# Patient Record
Sex: FEMALE | Race: WHITE | NOT HISPANIC OR LATINO | URBAN - METROPOLITAN AREA
[De-identification: names, ages, dates, MRNs, and addresses within clinical notes are randomized per-mention and may not be internally consistent; named-entity substitution may affect disease eponyms.]

---

## 2021-11-19 ENCOUNTER — LAB OUTSIDE AN ENCOUNTER (OUTPATIENT)
Dept: URBAN - METROPOLITAN AREA CLINIC 40 | Facility: CLINIC | Age: 26
End: 2021-11-19

## 2021-11-19 ENCOUNTER — OFFICE VISIT (OUTPATIENT)
Dept: URBAN - METROPOLITAN AREA CLINIC 40 | Facility: CLINIC | Age: 26
End: 2021-11-19
Payer: COMMERCIAL

## 2021-11-19 DIAGNOSIS — K20.90 ESOPHAGITIS DETERMINED BY ENDOSCOPY: ICD-10-CM

## 2021-11-19 DIAGNOSIS — R10.13 DYSPEPSIA: ICD-10-CM

## 2021-11-19 DIAGNOSIS — R19.7 DIARRHEA: ICD-10-CM

## 2021-11-19 DIAGNOSIS — K44.9 HIATAL HERNIA: ICD-10-CM

## 2021-11-19 PROCEDURE — 99204 OFFICE O/P NEW MOD 45 MIN: CPT | Performed by: INTERNAL MEDICINE

## 2021-11-19 RX ORDER — METOPROLOL TARTRATE 50 MG/1
AS DIRECTED TABLET, FILM COATED ORAL
Status: ACTIVE | COMMUNITY

## 2021-11-19 RX ORDER — VITAMIN E (DL,TOCOPHERYL ACET) 180 MG
AS DIRECTED CAPSULE ORAL
Status: ACTIVE | COMMUNITY

## 2021-11-19 RX ORDER — LEVOTHYROXINE SODIUM 0.05 MG/1
AS DIRECTED TABLET ORAL
Status: ACTIVE | COMMUNITY

## 2021-11-19 RX ORDER — FERROUS SULFATE TAB EC 325 MG (65 MG FE EQUIVALENT) 325 (65 FE) MG
AS DIRECTED TABLET DELAYED RESPONSE ORAL
Status: ACTIVE | COMMUNITY

## 2021-11-19 RX ORDER — FLUCONAZOLE 150 MG/1
AS DIRECTED TABLET ORAL
Status: ACTIVE | COMMUNITY

## 2021-11-19 RX ORDER — AMLODIPINE BESYLATE 5 MG/1
AS DIRECTED TABLET ORAL
Status: ACTIVE | COMMUNITY

## 2021-11-19 NOTE — HPI-TODAY'S VISIT:
Ms. Keller is a 26-year-old white female seen as a new patient evaluation for bloating, nausea,vomiting and dysphagia.  Patient has had issues with her stomach since 2020.  Records were reviewed from [facility] where she saw Dr. Botello.  EGD was performed by Dr. Botello on December 7, 2020.  She was noted to have grade 4 esophagitis.  Mild erythema was noted in the stomach.  She was dilated with a 60 mm savory dilator.  Notes indicate a gastric emptying study was to be performed but patient did not have this done.  She currently is still having a lot of issues with her stomach.  She is noting reflux to the point of it tasting like blood in her mouth.  Food hurts to swallow.  She is not had any hematemesis but has frequent nausea and vomiting.  She states she can throw up even in her sleep.  She currently is not on any PPI.  She states she been tried on several medications in the either did not work or caused side effects.  Carafate caused significant headaches.  She does admit to NSAID use in the form of Advil, ibuprofen and Goody's powders.  She does note stools are dark but is currently on iron.  Bowel movements are alternating between 1-2 bowel movements a day or 8-9 loose stools a day.  She has lost approximately 7 pounds over the last 2 weeks.  Review of her [facility] record shows that she has had a ultrasound and a CT within the last 30 days.  Ultrasound showed an unremarkable gallbladder.  CT showed no normal liver.  Pancreas was also noted to be normal.  She had small cysts in the kidney.  Small hiatal hernia was also noted.  Stool was noted in the colon.  There were no signs of bowel obstruction.

## 2021-12-01 ENCOUNTER — OFFICE VISIT (OUTPATIENT)
Dept: URBAN - METROPOLITAN AREA CLINIC 40 | Facility: CLINIC | Age: 26
End: 2021-12-01

## 2021-12-15 ENCOUNTER — OFFICE VISIT (OUTPATIENT)
Dept: URBAN - METROPOLITAN AREA SURGERY CENTER 30 | Facility: SURGERY CENTER | Age: 26
End: 2021-12-15
Payer: COMMERCIAL

## 2021-12-15 DIAGNOSIS — K21.00 ALKALINE REFLUX ESOPHAGITIS: ICD-10-CM

## 2021-12-15 DIAGNOSIS — K29.30 CHRONIC SUPERFICIAL GASTRITIS: ICD-10-CM

## 2021-12-15 DIAGNOSIS — K29.80 ACUTE DUODENITIS: ICD-10-CM

## 2021-12-15 PROCEDURE — G8907 PT DOC NO EVENTS ON DISCHARG: HCPCS | Performed by: INTERNAL MEDICINE

## 2021-12-15 PROCEDURE — 43239 EGD BIOPSY SINGLE/MULTIPLE: CPT | Performed by: INTERNAL MEDICINE

## 2021-12-30 ENCOUNTER — TELEPHONE ENCOUNTER (OUTPATIENT)
Dept: URBAN - METROPOLITAN AREA CLINIC 40 | Facility: CLINIC | Age: 26
End: 2021-12-30

## 2022-01-04 ENCOUNTER — OFFICE VISIT (OUTPATIENT)
Dept: URBAN - METROPOLITAN AREA TELEHEALTH 2 | Facility: TELEHEALTH | Age: 27
End: 2022-01-04
Payer: COMMERCIAL

## 2022-01-04 DIAGNOSIS — R10.13 DYSPEPSIA: ICD-10-CM

## 2022-01-04 DIAGNOSIS — R19.7 DIARRHEA: ICD-10-CM

## 2022-01-04 DIAGNOSIS — K20.90 ESOPHAGITIS DETERMINED BY ENDOSCOPY: ICD-10-CM

## 2022-01-04 DIAGNOSIS — R11.2 NAUSEA WITH VOMITING, UNSPECIFIED: ICD-10-CM

## 2022-01-04 PROCEDURE — 99213 OFFICE O/P EST LOW 20 MIN: CPT | Performed by: INTERNAL MEDICINE

## 2022-01-04 PROCEDURE — 99442 PHONE E/M BY PHYS 11-20 MIN: CPT | Performed by: INTERNAL MEDICINE

## 2022-01-04 RX ORDER — VITAMIN E (DL,TOCOPHERYL ACET) 180 MG
AS DIRECTED CAPSULE ORAL
Status: ACTIVE | COMMUNITY

## 2022-01-04 RX ORDER — FLUCONAZOLE 150 MG/1
AS DIRECTED TABLET ORAL
Status: ACTIVE | COMMUNITY

## 2022-01-04 RX ORDER — AMLODIPINE BESYLATE 5 MG/1
AS DIRECTED TABLET ORAL
Status: ACTIVE | COMMUNITY

## 2022-01-04 RX ORDER — FERROUS SULFATE TAB EC 325 MG (65 MG FE EQUIVALENT) 325 (65 FE) MG
AS DIRECTED TABLET DELAYED RESPONSE ORAL
Status: ACTIVE | COMMUNITY

## 2022-01-04 RX ORDER — LEVOTHYROXINE SODIUM 0.05 MG/1
AS DIRECTED TABLET ORAL
Status: ACTIVE | COMMUNITY

## 2022-01-04 RX ORDER — DEXLANSOPRAZOLE 60 MG/1
TAKE 1 CAPSULE (60 MG) BY ORAL ROUTE ONCE DAILY FOR 90 DAYS CAPSULE, DELAYED RELEASE ORAL 1
Qty: 90 | Refills: 1 | OUTPATIENT
Start: 2022-01-04

## 2022-01-04 RX ORDER — METOPROLOL TARTRATE 50 MG/1
AS DIRECTED TABLET, FILM COATED ORAL
Status: ACTIVE | COMMUNITY

## 2022-01-04 NOTE — HPI-TODAY'S VISIT:
Ms. Keller is seen at home for a telephone visit.  We attempted telehealth and patient was unable to get online.  Recall she was seen in November.  She has had prior work-up that was [place] [facility] with significant esophagitis and a hiatal hernia.  Patient was also complaining of diarrhea.  At that appointment stools were ordered.  Gastric emptying study was ordered.  An EGD was performed.  EGD was done on December 15.  This showed a small hiatal hernia.  Grade C esophagitis as well as gastritis was noted.  Biopsy showed reflux esophagitis.  Biopsies were negative for H. pylori over stomach.  Small bowel biopsy showed duodenitis.  She was given samples of Dexilant as well as instructed to start Bentyl that she had received from an emergency room visit.  Patient states the Dexilant has been helpful.  The nausea has improved although she still has episodes of burping and bringing up liquid.  She has tried to make changes with her diet.  She still having liquid stools.  She did not return the stool studies.  She not get a gastric emptying study done.  She would like those reordered.  She also did not try the Bentyl.  Patient has been on Prilosec, pantoprazole, Nexium, Prevacid and Pepcid in the past without improvement.

## 2022-02-22 ENCOUNTER — OFFICE VISIT (OUTPATIENT)
Dept: URBAN - METROPOLITAN AREA CLINIC 40 | Facility: CLINIC | Age: 27
End: 2022-02-22

## 2022-06-08 ENCOUNTER — OFFICE VISIT (OUTPATIENT)
Dept: URBAN - METROPOLITAN AREA CLINIC 40 | Facility: CLINIC | Age: 27
End: 2022-06-08

## 2022-06-08 RX ORDER — METOPROLOL TARTRATE 50 MG/1
AS DIRECTED TABLET, FILM COATED ORAL
Status: ACTIVE | COMMUNITY

## 2022-06-08 RX ORDER — DEXLANSOPRAZOLE 60 MG/1
TAKE 1 CAPSULE (60 MG) BY ORAL ROUTE ONCE DAILY FOR 90 DAYS CAPSULE, DELAYED RELEASE ORAL 1
Qty: 90 | Refills: 1 | Status: ACTIVE | COMMUNITY
Start: 2022-01-04

## 2022-06-08 RX ORDER — FLUCONAZOLE 150 MG/1
AS DIRECTED TABLET ORAL
Status: ACTIVE | COMMUNITY

## 2022-06-08 RX ORDER — LEVOTHYROXINE SODIUM 0.05 MG/1
AS DIRECTED TABLET ORAL
Status: ACTIVE | COMMUNITY

## 2022-06-08 RX ORDER — VITAMIN E (DL,TOCOPHERYL ACET) 180 MG
AS DIRECTED CAPSULE ORAL
Status: ACTIVE | COMMUNITY

## 2022-06-08 RX ORDER — AMLODIPINE BESYLATE 5 MG/1
AS DIRECTED TABLET ORAL
Status: ACTIVE | COMMUNITY

## 2022-06-08 RX ORDER — FERROUS SULFATE TAB EC 325 MG (65 MG FE EQUIVALENT) 325 (65 FE) MG
AS DIRECTED TABLET DELAYED RESPONSE ORAL
Status: ACTIVE | COMMUNITY

## 2022-06-14 ENCOUNTER — OFFICE VISIT (OUTPATIENT)
Dept: URBAN - METROPOLITAN AREA CLINIC 40 | Facility: CLINIC | Age: 27
End: 2022-06-14

## 2022-06-15 ENCOUNTER — OFFICE VISIT (OUTPATIENT)
Dept: URBAN - METROPOLITAN AREA CLINIC 40 | Facility: CLINIC | Age: 27
End: 2022-06-15

## 2022-06-16 ENCOUNTER — OFFICE VISIT (OUTPATIENT)
Dept: URBAN - METROPOLITAN AREA CLINIC 40 | Facility: CLINIC | Age: 27
End: 2022-06-16

## 2022-06-21 ENCOUNTER — DASHBOARD ENCOUNTERS (OUTPATIENT)
Age: 27
End: 2022-06-21

## 2022-06-23 ENCOUNTER — OFFICE VISIT (OUTPATIENT)
Dept: URBAN - METROPOLITAN AREA CLINIC 40 | Facility: CLINIC | Age: 27
End: 2022-06-23

## 2022-06-23 RX ORDER — DEXLANSOPRAZOLE 60 MG/1
TAKE 1 CAPSULE (60 MG) BY ORAL ROUTE ONCE DAILY FOR 90 DAYS CAPSULE, DELAYED RELEASE ORAL 1
Qty: 90 | Refills: 1 | OUTPATIENT

## 2022-06-23 RX ORDER — AMLODIPINE BESYLATE 5 MG/1
AS DIRECTED TABLET ORAL
Status: ACTIVE | COMMUNITY

## 2022-06-23 RX ORDER — VITAMIN E (DL,TOCOPHERYL ACET) 180 MG
AS DIRECTED CAPSULE ORAL
Status: ACTIVE | COMMUNITY

## 2022-06-23 RX ORDER — METOPROLOL TARTRATE 50 MG/1
AS DIRECTED TABLET, FILM COATED ORAL
Status: ACTIVE | COMMUNITY

## 2022-06-23 RX ORDER — LEVOTHYROXINE SODIUM 0.05 MG/1
AS DIRECTED TABLET ORAL
Status: ACTIVE | COMMUNITY

## 2022-06-23 RX ORDER — FERROUS SULFATE TAB EC 325 MG (65 MG FE EQUIVALENT) 325 (65 FE) MG
AS DIRECTED TABLET DELAYED RESPONSE ORAL
Status: ACTIVE | COMMUNITY

## 2022-06-23 RX ORDER — DEXLANSOPRAZOLE 60 MG/1
TAKE 1 CAPSULE (60 MG) BY ORAL ROUTE ONCE DAILY FOR 90 DAYS CAPSULE, DELAYED RELEASE ORAL 1
Qty: 90 | Refills: 1 | Status: ACTIVE | COMMUNITY
Start: 2022-01-04

## 2022-06-23 RX ORDER — FLUCONAZOLE 150 MG/1
AS DIRECTED TABLET ORAL
Status: ACTIVE | COMMUNITY

## 2025-07-22 ENCOUNTER — OFFICE VISIT (OUTPATIENT)
Dept: URBAN - METROPOLITAN AREA CLINIC 40 | Facility: CLINIC | Age: 30
End: 2025-07-22

## 2025-07-22 RX ORDER — FLUCONAZOLE 150 MG/1
AS DIRECTED TABLET ORAL
Status: ACTIVE | COMMUNITY

## 2025-07-22 RX ORDER — PANTOPRAZOLE SODIUM 40 MG/1
1 TABLET 1/2 TO 1 HOUR BEFORE MORNING MEAL TABLET, DELAYED RELEASE ORAL ONCE A DAY
Status: ACTIVE | COMMUNITY

## 2025-07-22 RX ORDER — VITAMIN E (DL,TOCOPHERYL ACET) 180 MG
AS DIRECTED CAPSULE ORAL
Status: ACTIVE | COMMUNITY

## 2025-07-22 RX ORDER — PROMETHAZINE HYDROCHLORIDE 25 MG/1
1 TABLET AS NEEDED TABLET ORAL
Status: ACTIVE | COMMUNITY

## 2025-07-22 RX ORDER — AMLODIPINE BESYLATE 5 MG/1
AS DIRECTED TABLET ORAL
Status: ACTIVE | COMMUNITY

## 2025-07-22 RX ORDER — SERTRALINE 50 MG/1
1 TABLET TABLET, FILM COATED ORAL ONCE A DAY
Status: ACTIVE | COMMUNITY

## 2025-07-22 RX ORDER — FERROUS SULFATE TAB EC 325 MG (65 MG FE EQUIVALENT) 325 (65 FE) MG
AS DIRECTED TABLET DELAYED RESPONSE ORAL
Status: ACTIVE | COMMUNITY

## 2025-07-22 RX ORDER — LEVOTHYROXINE SODIUM 0.05 MG/1
AS DIRECTED TABLET ORAL
Status: ACTIVE | COMMUNITY

## 2025-07-22 RX ORDER — DEXLANSOPRAZOLE 60 MG/1
TAKE 1 CAPSULE (60 MG) BY ORAL ROUTE ONCE DAILY FOR 90 DAYS CAPSULE, DELAYED RELEASE ORAL 1
Qty: 90 | Refills: 1 | Status: ON HOLD | COMMUNITY

## 2025-07-22 RX ORDER — METOPROLOL TARTRATE 50 MG/1
AS DIRECTED TABLET, FILM COATED ORAL
Status: ACTIVE | COMMUNITY